# Patient Record
Sex: FEMALE | Race: OTHER | Employment: UNEMPLOYED | URBAN - METROPOLITAN AREA
[De-identification: names, ages, dates, MRNs, and addresses within clinical notes are randomized per-mention and may not be internally consistent; named-entity substitution may affect disease eponyms.]

---

## 2020-11-20 ENCOUNTER — HOSPITAL ENCOUNTER (EMERGENCY)
Facility: HOSPITAL | Age: 50
Discharge: HOME | End: 2020-11-21
Attending: EMERGENCY MEDICINE
Payer: OTHER GOVERNMENT

## 2020-11-20 ENCOUNTER — APPOINTMENT (OUTPATIENT)
Dept: GENERAL RADIOLOGY | Facility: HOSPITAL | Age: 50
End: 2020-11-20
Attending: EMERGENCY MEDICINE
Payer: OTHER GOVERNMENT

## 2020-11-20 DIAGNOSIS — J18.9 PNEUMONIA OF BOTH LUNGS DUE TO INFECTIOUS ORGANISM, UNSPECIFIED PART OF LUNG: Primary | ICD-10-CM

## 2020-11-20 DIAGNOSIS — Z20.822 SUSPECTED COVID-19 VIRUS INFECTION: ICD-10-CM

## 2020-11-20 PROCEDURE — 99284 EMERGENCY DEPT VISIT MOD MDM: CPT

## 2020-11-20 PROCEDURE — 71045 X-RAY EXAM CHEST 1 VIEW: CPT | Performed by: EMERGENCY MEDICINE

## 2020-11-20 PROCEDURE — 99453 REM MNTR PHYSIOL PARAM SETUP: CPT

## 2020-11-20 RX ORDER — GUAIFENESIN 100 MG/5ML
200 SOLUTION ORAL ONCE
Status: COMPLETED | OUTPATIENT
Start: 2020-11-20 | End: 2020-11-20

## 2020-11-20 RX ORDER — CODEINE PHOSPHATE AND GUAIFENESIN 10; 100 MG/5ML; MG/5ML
5 SOLUTION ORAL EVERY 6 HOURS PRN
Qty: 118 ML | Refills: 0 | Status: SHIPPED | OUTPATIENT
Start: 2020-11-20

## 2020-11-20 RX ORDER — ACETAMINOPHEN 500 MG
1000 TABLET ORAL ONCE
Status: COMPLETED | OUTPATIENT
Start: 2020-11-20 | End: 2020-11-20

## 2020-11-21 VITALS
HEART RATE: 102 BPM | WEIGHT: 163 LBS | RESPIRATION RATE: 20 BRPM | OXYGEN SATURATION: 95 % | TEMPERATURE: 100 F | SYSTOLIC BLOOD PRESSURE: 113 MMHG | DIASTOLIC BLOOD PRESSURE: 67 MMHG

## 2020-11-21 NOTE — ED PROVIDER NOTES
Patient Seen in: [facility] AND [facility] Department      History   Patient presents with:  Testing    Stated Complaint: testing/ 02 sats 92/fever    HPI    78-year-old female with history of diabetes, arthritis, here with complaints of fever, myalgia (Room air)       Current:/77   Pulse 116   Temp (!) 101.5 °F (38.6 °C) (Oral)   Resp 20   Wt 73.9 kg   SpO2 95%         Physical Exam  Vitals signs and nursing note reviewed. HENT:      Head: Normocephalic.       Mouth/Throat:      Mouth: Mucous mem guaifenesin ordered  - covid pending  - pt well appearing, no hypoxia, no tachypnea  - suspect covid pneumonia in setting of positive sick contact - will hold off on antibiotics for now      Medical Record Review: I personally reviewed available prior medi these medications    guaiFENesin-codeine (CHERATUSSIN AC) 100-10 MG/5ML Oral Solution  Take 5 mL by mouth every 6 (six) hours as needed for cough.   Qty: 118 mL Refills: 0

## 2020-11-21 NOTE — ED NOTES
Pt verbalized understanding of home pulse ox. Pt return demonstrated use of pulse ox and verbalized understanding.

## 2020-11-22 LAB — SARS-COV-2 RNA RESP QL NAA+PROBE: DETECTED
